# Patient Record
Sex: MALE | Race: WHITE | NOT HISPANIC OR LATINO | Employment: FULL TIME | URBAN - METROPOLITAN AREA
[De-identification: names, ages, dates, MRNs, and addresses within clinical notes are randomized per-mention and may not be internally consistent; named-entity substitution may affect disease eponyms.]

---

## 2022-01-06 ENCOUNTER — OFFICE VISIT (OUTPATIENT)
Dept: OBGYN CLINIC | Facility: CLINIC | Age: 62
End: 2022-01-06
Payer: COMMERCIAL

## 2022-01-06 VITALS
SYSTOLIC BLOOD PRESSURE: 153 MMHG | HEART RATE: 73 BPM | BODY MASS INDEX: 28.5 KG/M2 | HEIGHT: 67 IN | WEIGHT: 181.6 LBS | DIASTOLIC BLOOD PRESSURE: 80 MMHG

## 2022-01-06 DIAGNOSIS — M51.36 DDD (DEGENERATIVE DISC DISEASE), LUMBAR: ICD-10-CM

## 2022-01-06 DIAGNOSIS — M54.50 RIGHT LOW BACK PAIN, UNSPECIFIED CHRONICITY, UNSPECIFIED WHETHER SCIATICA PRESENT: ICD-10-CM

## 2022-01-06 DIAGNOSIS — M25.551 PAIN IN RIGHT HIP: Primary | ICD-10-CM

## 2022-01-06 DIAGNOSIS — M54.16 RADICULOPATHY, LUMBAR REGION: ICD-10-CM

## 2022-01-06 PROCEDURE — 99204 OFFICE O/P NEW MOD 45 MIN: CPT | Performed by: ORTHOPAEDIC SURGERY

## 2022-01-06 RX ORDER — EZETIMIBE 10 MG/1
TABLET ORAL
COMMUNITY
Start: 2007-11-16 | End: 2022-02-18

## 2022-01-06 RX ORDER — DICLOFENAC SODIUM 75 MG/1
75 TABLET, DELAYED RELEASE ORAL 2 TIMES DAILY
Qty: 60 TABLET | Refills: 1 | Status: SHIPPED | OUTPATIENT
Start: 2022-01-06

## 2022-01-06 RX ORDER — OMEGA-3-ACID ETHYL ESTERS 1 G/1
CAPSULE, LIQUID FILLED ORAL
COMMUNITY
Start: 2007-11-16 | End: 2022-02-18

## 2022-01-06 NOTE — PROGRESS NOTES
Assessment/Plan     1  Pain in right hip    2  Right low back pain, unspecified chronicity, unspecified whether sciatica present    3  DDD (degenerative disc disease), lumbar    4  Radiculopathy, lumbar region      Orders Placed This Encounter   Procedures    XR hip/pelv 2-3 vws right if performed    XR spine lumbar minimum 4 views non injury    MRI lumbar spine wo contrast    Ambulatory referral to Pain Management       · Patient has mild right hip osteoarthritis, L5-S1 DDD and lumbar radiculopathy   · Will be ordering MRI lumbar spine to r/o radiculopathy   · Will be referring patient to PM Dr Jennifer Chirinos to discuss MRI results  · PT order was given out today for low back   Prescribed patient Diclofenac prn pain, medications warnings were reviewed with patient  · He will call for  Medrol Satya to be sent to the pharmacy if he has no relief from the Diclofenac  He is aware the steroids may dampen his immune system and to take extra precautions  · May use heat or ice to his low back for pain relief    Return for Follow up with Dr Jennifer Chirinos to discuss MRI lumbar spine   I answered all of the patient's questions during the visit and provided education of the patient's condition during the visit  The patient verbalized understanding of the information given and agrees with the plan  This note was dictated using [employer] software  It may contain errors including improperly dictated words  Please contact physician directly for any questions  History of Present Illness   Chief complaint:   Chief Complaint   Patient presents with    Right Hip - Pain       HPI: Danny Richmond is a 57 y o  male that c/o right hip pain  He states he has been having right sided low back, buttock and numbness radiating down to the posterolateral knee for years off and on is progressively getting worse the last year  He was treating with a chiropractor  Patient is worse with hyperextension of his low back and twisting   He notes occasional right leg instability  He is currently not taking any pain medications but was taking IBU with relief  He notes occasional radiating pain down the calf  He has no history of having any injections , physical therapy or surgeries on the right hip  Denies any bladder or bowel changes  ROS:    See HPI for musculoskeletal review  All other systems reviewed are negative     Historical Information   History reviewed  No pertinent past medical history  Past Surgical History:   Procedure Laterality Date    HEMORROIDECTOMY       Social History   Social History     Substance and Sexual Activity   Alcohol Use None     Social History     Substance and Sexual Activity   Drug Use Not on file     Social History     Tobacco Use   Smoking Status Never Smoker   Smokeless Tobacco Never Used     Family History: No family history on file  Current Outpatient Medications on File Prior to Visit   Medication Sig Dispense Refill    ezetimibe (Zetia) 10 mg tablet Take by mouth      omega-3-acid ethyl esters (Lovaza) 1 g capsule Take by mouth       No current facility-administered medications on file prior to visit  Allergies   Allergen Reactions    Other Sneezing       Objective   Vitals: Blood pressure 153/80, pulse 73, height 5' 7" (1 702 m), weight 82 4 kg (181 lb 9 6 oz)  ,Body mass index is 28 44 kg/m²  PE:  AAOx 3  WDWN  Hearing intact, no drainage from eyes  Regular rate  no audible wheezing  no abdominal distension  LE compartments soft, skin intact    right hip:   No dislocation/deformity  Neg  Stinchfield  ROM: FF 0-130 with mild pain   ER 0-60  IR 0-20 with mild pain   +  Becky Test  +  Impingement test  No TTP over greater trochanter  Abduction: 5/5/ no pain with resistant abduction   Neg   Bimal's test  No TTP over SIJ    bilateralLE:    LLE:  EHL/AT/GS/quads/hamstrings/iliopsoas 5/5, sensation grossly intact L4, L5, S1, palpable pedal pulse    RLE:  EHL/AT/GS/quads/hamstrings/iliopsoas 5/5, sensation grossly intact , L5, S1,  Decrease sensation L4, palpable pedal pulse    Back:    No TTP over lumbar spinous processes, paraspinal musculature  SLR: Neg       Imaging Studies: I have personally reviewed pertinent films in PACS   XR righthip:  Mild DJD   Xr lumbar spine: L5-S1 DDD       Scribe Attestation    I,:   Maura Joiner am acting as a scribe while in the presence of the attending physician :       I,:  Zachary Nick DO personally performed the services described in this documentation    as scribed in my presence :

## 2022-01-28 ENCOUNTER — TELEPHONE (OUTPATIENT)
Dept: OBGYN CLINIC | Facility: HOSPITAL | Age: 62
End: 2022-01-28

## 2022-01-28 NOTE — TELEPHONE ENCOUNTER
Patient sees Dr Mace    Patient is calling regarding a pre-auth for a MRI of the Lumbar Spine  Patient states that Pernell Arteaga just called him to cancel his appt for 01/30/22 due to no pre-auth approval   It was just submitted yesterday 01/27/22    Patient is very upset and would like to know what happened      Please Advise  cb - [phone]

## 2022-02-14 ENCOUNTER — HOSPITAL ENCOUNTER (OUTPATIENT)
Dept: MRI IMAGING | Facility: HOSPITAL | Age: 62
Discharge: HOME | End: 2022-02-14
Attending: ORTHOPAEDIC SURGERY
Payer: COMMERCIAL

## 2022-02-14 DIAGNOSIS — M54.50 RIGHT LOW BACK PAIN, UNSPECIFIED CHRONICITY, UNSPECIFIED WHETHER SCIATICA PRESENT: ICD-10-CM

## 2022-02-14 DIAGNOSIS — M54.16 RADICULOPATHY, LUMBAR REGION: ICD-10-CM

## 2022-02-14 DIAGNOSIS — M51.36 DDD (DEGENERATIVE DISC DISEASE), LUMBAR: ICD-10-CM

## 2022-02-14 PROCEDURE — G1004 CDSM NDSC: HCPCS

## 2022-02-14 PROCEDURE — 72148 MRI LUMBAR SPINE W/O DYE: CPT

## 2022-02-18 ENCOUNTER — CONSULT (OUTPATIENT)
Dept: PAIN MEDICINE | Facility: CLINIC | Age: 62
End: 2022-02-18
Payer: COMMERCIAL

## 2022-02-18 VITALS
BODY MASS INDEX: 28.41 KG/M2 | HEIGHT: 67 IN | OXYGEN SATURATION: 97 % | DIASTOLIC BLOOD PRESSURE: 80 MMHG | WEIGHT: 181 LBS | SYSTOLIC BLOOD PRESSURE: 122 MMHG | TEMPERATURE: 98 F | HEART RATE: 73 BPM

## 2022-02-18 DIAGNOSIS — M54.50 RIGHT LOW BACK PAIN, UNSPECIFIED CHRONICITY, UNSPECIFIED WHETHER SCIATICA PRESENT: ICD-10-CM

## 2022-02-18 DIAGNOSIS — M51.36 DDD (DEGENERATIVE DISC DISEASE), LUMBAR: Primary | ICD-10-CM

## 2022-02-18 DIAGNOSIS — M54.16 RADICULOPATHY, LUMBAR REGION: ICD-10-CM

## 2022-02-18 DIAGNOSIS — M51.26 LUMBAR DISC HERNIATION: ICD-10-CM

## 2022-02-18 PROCEDURE — 99244 OFF/OP CNSLTJ NEW/EST MOD 40: CPT | Performed by: PHYSICAL MEDICINE & REHABILITATION

## 2022-02-18 NOTE — PATIENT INSTRUCTIONS
Lumbar Radiculopathy   WHAT YOU NEED TO KNOW:   Lumbar radiculopathy is a painful condition that happens when a nerve in your lumbar spine (lower back) is pinched or irritated  Nerves control feeling and movement in your body  You may have numbness or pain that shoots down from your lower back towards your foot  DISCHARGE INSTRUCTIONS:   Medicines:   · Medicines:     ? NSAIDs , such as ibuprofen, help decrease swelling, pain, and fever  This medicine is available with or without a doctor's order  NSAIDs can cause stomach bleeding or kidney problems in certain people  If you take blood thinner medicine, always ask your healthcare provider if NSAIDs are safe for you  Always read the medicine label and follow directions  ? Muscle relaxers  help decrease pain and muscle spasms  ? Opioids: This is a strong medicine given to reduce severe pain  It is also called narcotic pain medicine  Take this medicine exactly as directed by your healthcare provider  ? Oral steroids: Steroids may also be given to reduce pain and swelling  ? Take your medicine as directed  Contact your healthcare provider if you think your medicine is not helping or if you have side effects  Tell him of her if you are allergic to any medicine  Keep a list of the medicines, vitamins, and herbs you take  Include the amounts, and when and why you take them  Bring the list or the pill bottles to follow-up visits  Carry your medicine list with you in case of an emergency  Follow up with your healthcare provider or spine specialist within 1 to 3 weeks:  After your first follow-up appointment, return to your healthcare provider or spine specialist every 2 weeks until you have healed  Ask for information about physical therapy for your condition  Write down your questions so you remember to ask them during your visits  Physical therapy:  You may need physical therapy to improve your condition   Your physical therapist may teach you certain exercises to improve posture (the way you stand and sit), flexibility, and strength in your lower back  Self care:   · Stay active: It is best to be active when you have lumbar radiculopathy  Your physical therapist or healthcare provider may tell you to take walks to ease yourself back into your daily routine  Avoid long periods of bed rest  Bed rest could worsen your symptoms  Do not move in ways that increase your pain  Ask for more information about the best ways to stay active  · Use ice or heat packs:  Use ice or heat packs as directed on the sore area of your body to decrease the pain and swelling  Put ice in a plastic bag covered with a towel on your low back  Cover heated items with a towel to avoid burns  Use ice and heat as directed  · Avoid heavy lifting: Your condition may worsen if you lift heavy things  Avoid lifting if possible  · Maintain a healthy weight:  Excess body weight may strain your back  Talk with your healthcare provider about ways to lose excess weight if you are overweight  Contact your healthcare provider or spine specialist if:   · Your pain does not improve within 1 to 3 weeks after treatment  · Your pain and weakness keep you from your normal activities at work, home, or school  · You lose more than 10 pounds in 6 months without trying  · You become depressed or sad because of the pain  · You have questions or concerns about your condition or care  Return to the emergency department if:   · You have a fever greater than 100 4°F for longer than 2 days  · You have new, severe back or leg pain, or your pain spreads to both legs  · You have any new signs of numbness or weakness, especially in your lower back, legs, arms, or genital area  · You have new trouble controlling your urine and bowel movements  · You do not feel like your bladder empties when you urinate      © Copyright [employer] 2021 Information is for End User's use only and may not be sold, redistributed or otherwise used for commercial purposes  All illustrations and images included in CareNotes® are the copyrighted property of A D A M , Inc  or Deepak Herndon  The above information is an  only  It is not intended as medical advice for individual conditions or treatments  Talk to your doctor, nurse or pharmacist before following any medical regimen to see if it is safe and effective for you

## 2022-02-18 NOTE — PROGRESS NOTES
Assessment  1  DDD (degenerative disc disease), lumbar    2  Right low back pain, unspecified chronicity, unspecified whether sciatica present    3  Radiculopathy, lumbar region    4  Lumbar disc herniation        Plan  Mr Narayan is a pleasant 19-year-old male who presents for initial evaluation regarding low back pain with radiating symptoms into the right lower extremity of several months duration  During today's evaluation he is demonstrating clinical and diagnostic evidence of lumbar radiculopathy likely in relation to the mild central stenosis at L3-L4 and broad-based disc herniation at L4-L5 as identified on the lumbar MRI without contrast   At this time interventional approaches would be beneficial and warranted  As such we will   1  We will plan for lumbar epidural steroid injection L4-L5 right paramedian approach under fluoro guidance   2  Offered membrane stabilizing agent medications including gabapentin but patient does not wish to trial oral medications at this time   3  Complete risks and benefits including bleeding, infection, tissue reaction, nerve injury and allergic reaction were discussed  The approach was demonstrated using models and literature was provided  Verbal and written consent was obtained  My impressions and treatment recommendations were discussed in detail with the patient who verbalized understanding and had no further questions  Discharge instructions were provided  I personally saw and examined the patient and I agree with the above discussed plan of care  Orders Placed This Encounter   Procedures    [place] spine and pain procedure     Standing Status:   Future     Standing Expiration Date:   2/18/2026     Order Specific Question:   Reason for Exam:     Answer:   LESI (L4-L5)     Order Specific Question:   Anticoagulant hold needed? Answer:   No     No orders of the defined types were placed in this encounter  History of Present Illness    Eulogio Hernandez is a 57 y o  male presents to [facility] for initial evaluation regarding 4 years duration of low back pain with radiating symptoms into the right lower extremity  Patient denies any significant inciting event or recent trauma  Today reports moderate to severe pain rated 3 to 7/10 and interfering with daily activities  Pain is nearly constant 60-95% of the time that is present throughout the day and night  Describes symptoms as shooting, numbness, throbbing pain  Also reports lower extremity weakness but denies falls  Does not use any durable medical equipment for ambulation  Symptoms are worse with lying down, bending, sitting, walking, exercise  Has had no significant relief with chiropractic manipulation and reports moderate relief with heat  Currently taking over-the-counter Tylenol and Motrin with minimal relief  Presents today for initial evaluation  I have personally reviewed and/or updated the patient's past medical history, past surgical history, family history, social history, current medications, allergies, and vital signs today  Review of Systems   Constitutional: Negative for fever and unexpected weight change  HENT: Negative for trouble swallowing  Eyes: Negative for visual disturbance  Respiratory: Negative for shortness of breath and wheezing  Cardiovascular: Negative for chest pain and palpitations  Gastrointestinal: Negative for constipation, diarrhea, nausea and vomiting  Endocrine: Positive for polyuria  Negative for cold intolerance, heat intolerance and polydipsia  Genitourinary: Negative for difficulty urinating and frequency  Musculoskeletal: Positive for back pain, joint swelling and myalgias  Negative for arthralgias and gait problem  Skin: Positive for wound  Negative for rash  Neurological: Negative for dizziness, seizures, syncope, weakness and headaches  Hematological: Does not bruise/bleed easily     Psychiatric/Behavioral: Negative for dysphoric mood  All other systems reviewed and are negative  There is no problem list on file for this patient  Past Medical History:   Diagnosis Date    Low back pain        Past Surgical History:   Procedure Laterality Date    HEMORROIDECTOMY      LAPAROSCOPIC INGUINAL HERNIA REPAIR         Family History   Problem Relation Age of Onset    No Known Problems Mother     No Known Problems Father        Social History     Occupational History    Not on file   Tobacco Use    Smoking status: Never Smoker    Smokeless tobacco: Never Used   Vaping Use    Vaping Use: Never used   Substance and Sexual Activity    Alcohol use: Yes     Alcohol/week: 2 0 standard drinks     Types: 2 Cans of beer per week     Comment: occasionally    Drug use: Never    Sexual activity: Yes     Partners: Female     Birth control/protection: Male Sterilization     Comment:        Current Outpatient Medications on File Prior to Visit   Medication Sig    diclofenac (VOLTAREN) 75 mg EC tablet Take 1 tablet (75 mg total) by mouth 2 (two) times a day PRN for pain (Patient not taking: Reported on 2/18/2022 )    [DISCONTINUED] ezetimibe (Zetia) 10 mg tablet Take by mouth    [DISCONTINUED] omega-3-acid ethyl esters (Lovaza) 1 g capsule Take by mouth     No current facility-administered medications on file prior to visit  Allergies   Allergen Reactions    Other Sneezing       Physical Exam    /80   Pulse 73   Temp 98 °F (36 7 °C)   Ht 5' 7" (1 702 m)   Wt 82 1 kg (181 lb)   SpO2 97%   BMI 28 35 kg/m²     Constitutional: normal, well developed, well nourished, alert, in no distress and non-toxic and no overt pain behavior    Eyes: anicteric  HEENT: grossly intact  Neck: supple, symmetric, trachea midline and no masses   Pulmonary:even and unlabored  Cardiovascular:No edema or pitting edema present  Skin:Normal without rashes or lesions and well hydrated  Psychiatric:Mood and affect appropriate  Neurologic:Cranial Nerves II-XII grossly intact  Musculoskeletal:antalgic, tenderness to palpation bilateral lumbar paraspinals, decreased active and passive range of motion with lumbar flexion and extension limited by pain, MMT 5/5 bilateral lower extremities, sensation decreased to light touch in patchy distribution pressure that her right leg, DTRs within normal limits, positive straight leg raise in the supine position with radicular pain into the posterolateral right leg    Imaging    Study Result    Narrative & Impression   MRI LUMBAR SPINE WITHOUT CONTRAST     INDICATION: M54 50: Low back pain, unspecified  M51 36: Other intervertebral disc degeneration, lumbar region  M54 16: Radiculopathy, lumbar region      COMPARISON:  None      TECHNIQUE:  Sagittal T1, sagittal T2, sagittal inversion recovery, axial T1 and axial T2, coronal T2     IMAGE QUALITY:  Diagnostic     FINDINGS:     VERTEBRAL BODIES:  There are 5 lumbar type vertebral bodies  Normal alignment of the lumbar spine  No spondylolysis or spondylolisthesis  No scoliosis  No compression fracture  Normal marrow signal is identified within the visualized bony   structures  No discrete marrow lesion      SACRUM:  Normal signal within the sacrum  No evidence of insufficiency or stress fracture      DISTAL CORD AND CONUS:  Normal size and signal within the distal cord and conus      PARASPINAL SOFT TISSUES:  Paraspinal soft tissues are unremarkable      LOWER THORACIC DISC SPACES:  Normal disc height and signal   No disc herniation, canal stenosis or foraminal narrowing      LUMBAR DISC SPACES:     L1-L2:  Normal      L2-L3:  Normal      L3-L4:  Mild facet hypertrophy  Minimal bulge  Minimal central stenosis without foraminal narrowing      L4-L5:  Broad based left foraminal/far lateral protrusion type disc herniation contacts the extraforaminal left L4 nerve root    Correlate for left L4 radiculopathy      L5-S1: Normal      IMPRESSION:     Left foraminal/far lateral protrusion at L4-5 contacts the extraforaminal left L4 nerve root    Correlate for left L4 radiculopathy         Workstation performed: [id number]

## 2022-03-10 ENCOUNTER — HOSPITAL ENCOUNTER (OUTPATIENT)
Dept: RADIOLOGY | Facility: MEDICAL CENTER | Age: 62
Discharge: HOME | End: 2022-03-10
Admitting: PHYSICAL MEDICINE & REHABILITATION
Payer: COMMERCIAL

## 2022-03-10 VITALS
SYSTOLIC BLOOD PRESSURE: 146 MMHG | TEMPERATURE: 97.9 F | RESPIRATION RATE: 20 BRPM | HEART RATE: 72 BPM | DIASTOLIC BLOOD PRESSURE: 87 MMHG | OXYGEN SATURATION: 97 %

## 2022-03-10 DIAGNOSIS — M54.16 RADICULOPATHY, LUMBAR REGION: ICD-10-CM

## 2022-03-10 DIAGNOSIS — M54.50 RIGHT LOW BACK PAIN, UNSPECIFIED CHRONICITY, UNSPECIFIED WHETHER SCIATICA PRESENT: ICD-10-CM

## 2022-03-10 DIAGNOSIS — M51.36 DDD (DEGENERATIVE DISC DISEASE), LUMBAR: ICD-10-CM

## 2022-03-10 DIAGNOSIS — M51.26 LUMBAR DISC HERNIATION: ICD-10-CM

## 2022-03-10 PROCEDURE — 62323 NJX INTERLAMINAR LMBR/SAC: CPT | Performed by: PHYSICAL MEDICINE & REHABILITATION

## 2022-03-10 RX ORDER — METHYLPREDNISOLONE ACETATE 80 MG/ML
80 INJECTION, SUSPENSION INTRA-ARTICULAR; INTRALESIONAL; INTRAMUSCULAR; PARENTERAL; SOFT TISSUE ONCE
Status: COMPLETED | OUTPATIENT
Start: 2022-03-10 | End: 2022-03-10

## 2022-03-10 RX ADMIN — IOHEXOL 1 ML: 300 INJECTION, SOLUTION INTRAVENOUS at 09:45

## 2022-03-10 RX ADMIN — METHYLPREDNISOLONE ACETATE 80 MG: 80 INJECTION, SUSPENSION INTRA-ARTICULAR; INTRALESIONAL; INTRAMUSCULAR; PARENTERAL; SOFT TISSUE at 09:46

## 2022-03-10 NOTE — DISCHARGE INSTRUCTIONS
Epidural Steroid Injection   WHAT YOU NEED TO KNOW:   An epidural steroid injection (SOM) is a procedure to inject steroid medicine into the epidural space  The epidural space is between your spinal cord and vertebrae  Steroids reduce inflammation and fluid buildup in your spine that may be causing pain  You may be given pain medicine along with the steroids  ACTIVITY  · Do not drive or operate machinery today  · No strenuous activity today - bending, lifting, etc   · You may resume normal activites starting tomorrow - start slowly and as tolerated  · You may shower today, but no tub baths or hot tubs  · You may have numbness for several hours from the local anesthetic  Please use caution and common sense, especially with weight-bearing activities  CARE OF THE INJECTION SITE  · If you have soreness or pain, apply ice to the area today (20 minutes on/20 minutes off)  · Starting tomorrow, you may use warm, moist heat or ice if needed  · You may have an increase or change in your discomfort for 36-48 hours after your treatment  · Apply ice and continue with any pain medication you have been prescribed  · [facility]ify [facility] if you have any of the following: redness, drainage, swelling, headache, stiff neck or fever above 100°F     SPECIAL INSTRUCTIONS  · Our office will contact you in approximately 7 days for a progress report  MEDICATIONS  · Continue to take all routine medications  · Our office may have instructed you to hold some medications  As no general anesthesia was used in today's procedure, you should not experience any side effects related to anesthesia  If you have a problem specifically related to your procedure, please call our office at [phone]  Problems not related to your procedure should be directed to your primary care physician

## 2022-03-10 NOTE — H&P
History of Present Illness: The patient is a 57 y o  male who presents with complaints of low back pain    There is no problem list on file for this patient  Past Medical History:   Diagnosis Date    Low back pain        Past Surgical History:   Procedure Laterality Date    HEMORROIDECTOMY      LAPAROSCOPIC INGUINAL HERNIA REPAIR           Current Outpatient Medications:     diclofenac (VOLTAREN) 75 mg EC tablet, Take 1 tablet (75 mg total) by mouth 2 (two) times a day PRN for pain (Patient not taking: Reported on 2/18/2022 ), Disp: 60 tablet, Rfl: 1    Current Facility-Administered Medications:     iohexol (OMNIPAQUE) 300 mg/mL injection 50 mL, 50 mL, Epidural, Once, Chana Eris, DO    methylPREDNISolone acetate (DEPO-MEDROL) injection 80 mg, 80 mg, Epidural, Once, Chana Eris, DO    Allergies   Allergen Reactions    Other Sneezing       Physical Exam:   Vitals:    03/10/22 0932   BP: 136/79   Pulse: 69   Resp: 18   Temp: 97 9 °F (36 6 °C)   SpO2: 96%     General: Awake, Alert, Oriented x 3, Mood and affect appropriate  Respiratory: Respirations even and unlabored  Cardiovascular: Peripheral pulses intact; no edema  Musculoskeletal Exam:  Tenderness to palpation bilateral lumbar paraspinals    ASA Score: 2    Patient/Chart Verification  Patient ID Verified: Verbal  ID Band Applied: No  Consents Confirmed: Procedural,To be obtained in the Pre-Procedure area  H&P( within 30 days) Verified: To be obtained in the Pre-Procedure area  Interval H&P(within 24 hr) Complete (required for Outpatients and Surgery Admit only): To be obtained in the Pre-Procedure area  Allergies Reviewed: Yes  Anticoag/NSAID held?: NA (Pt denies taking blood thinning medications)  Currently on antibiotics?: No    Assessment:   1  Right low back pain, unspecified chronicity, unspecified whether sciatica present    2  DDD (degenerative disc disease), lumbar    3  Radiculopathy, lumbar region    4   Lumbar disc herniation Plan: LESI (L4-L5)

## 2022-03-17 ENCOUNTER — TELEPHONE (OUTPATIENT)
Dept: PAIN MEDICINE | Facility: CLINIC | Age: 62
End: 2022-03-17

## 2022-03-17 NOTE — TELEPHONE ENCOUNTER
Pt reports 40-50% improvement post inj   Pain level 4/10  Pt aware I will call next week for an update

## 2022-05-10 ENCOUNTER — APPOINTMENT (OUTPATIENT)
Dept: RADIOLOGY | Facility: MEDICAL CENTER | Age: 62
End: 2022-05-10
Payer: COMMERCIAL

## 2022-05-10 DIAGNOSIS — M25.512 ACUTE PAIN OF LEFT SHOULDER: ICD-10-CM

## 2022-05-10 PROCEDURE — 73030 X-RAY EXAM OF SHOULDER: CPT

## 2022-05-18 DIAGNOSIS — M19.019 ARTHRITIS OF GLENOHUMERAL JOINT: Primary | ICD-10-CM

## 2022-05-25 ENCOUNTER — TELEPHONE (OUTPATIENT)
Dept: PAIN MEDICINE | Facility: CLINIC | Age: 62
End: 2022-05-25

## 2022-07-05 NOTE — TELEPHONE ENCOUNTER
Pt called in he was suppose to be scheduled in [facility] but the pt is scheduled in [email or web address]  Please be advised thank you    Pt can be reached @ [phone]

## 2022-07-05 NOTE — TELEPHONE ENCOUNTER
Pt is a Dr Afia Campa patient and was scheduled for [facility]  Patient called to tell them today at 11:00 and no one called him back  Pt is a little upset about it  Pt had to wait a couple of months and now he is in [facility]  patient stated he made the appointment with a [facility] person  Please call pt back his appointment is tomorrow at 3:15  Now pt has to cancel this appointment because of work  Please call pt and cancel appointment and reschedule with [place] with Dr Afia Campa as soon as possible      MRN # [id number]  Arie Garg  1960  [id number]

## 2022-07-06 NOTE — TELEPHONE ENCOUNTER
Cristian Turcios,    Are you able to help this patient get rescheduled for his procedure in the [facility] area?     Marianna Hayes

## 2022-07-06 NOTE — TELEPHONE ENCOUNTER
Pt called  in the phone  In error he was scheduled at the [facility] office it should be in [place]   Transferred the call  Please be advised thank you

## 2022-07-06 NOTE — TELEPHONE ENCOUNTER
Severiano Chirinos, this is the patient I left you a voicemail message about  He would really like a call today to see when he can be rescheduled to [place]      Thank you,  Osmani Luciano

## 2022-08-10 ENCOUNTER — PROCEDURE VISIT (OUTPATIENT)
Dept: PAIN MEDICINE | Facility: CLINIC | Age: 62
End: 2022-08-10
Payer: COMMERCIAL

## 2022-08-10 DIAGNOSIS — M19.019 ARTHRITIS OF GLENOHUMERAL JOINT: Primary | ICD-10-CM

## 2022-08-10 PROCEDURE — 20611 DRAIN/INJ JOINT/BURSA W/US: CPT | Performed by: PHYSICAL MEDICINE & REHABILITATION

## 2022-08-10 RX ORDER — LIDOCAINE HYDROCHLORIDE 10 MG/ML
5 INJECTION, SOLUTION INFILTRATION; PERINEURAL ONCE
Status: COMPLETED | OUTPATIENT
Start: 2022-08-10 | End: 2022-08-10

## 2022-08-10 RX ORDER — METHYLPREDNISOLONE ACETATE 40 MG/ML
40 INJECTION, SUSPENSION INTRA-ARTICULAR; INTRALESIONAL; INTRAMUSCULAR; SOFT TISSUE ONCE
Status: COMPLETED | OUTPATIENT
Start: 2022-08-10 | End: 2022-08-10

## 2022-08-10 RX ORDER — BUPIVACAINE HYDROCHLORIDE 2.5 MG/ML
INJECTION, SOLUTION EPIDURAL; INFILTRATION; INTRACAUDAL ONCE
Status: CANCELLED | OUTPATIENT
Start: 2022-08-10

## 2022-08-10 RX ADMIN — METHYLPREDNISOLONE ACETATE 40 MG: 40 INJECTION, SUSPENSION INTRA-ARTICULAR; INTRALESIONAL; INTRAMUSCULAR; SOFT TISSUE at 15:49

## 2022-08-10 RX ADMIN — LIDOCAINE HYDROCHLORIDE 5 ML: 10 INJECTION, SOLUTION INFILTRATION; PERINEURAL at 15:49

## 2022-08-10 NOTE — PROGRESS NOTES
Indication:  Shoulder pain  Preprocedure diagnosis:  1  Osteoarthritis of the shoulder                                                   2  Shoulder pain  Postprocedure diagnosis:  1  Osteoarthritis of the shoulder                                                     2  Shoulder pain    Procedure: Ultrasound-guided left glenohumeral injection    After discussing the risks, benefits, and alternatives to the procedure, the patient expressed understanding and wished to proceed  The patient was brought to the procedure suite and placed in the sidelying position  A procedural pause was conducted to verify:  correct patient identity, procedure to be performed and as applicable, correct side and site, correct patient position, and availability of implants, special equipment or special requirements  A simple surgical tray was used  Prior to the procedure, the shoulder was examined with a 12 MHz linear transducer to visualize the glenohumeral joint and determine the optimal needle path  Following this, the shoulder was prepared with a ChloraPrep scrub, then re-examined using the same transducer, a sterile ultrasound transducer cover, and sterile ultrasound transducer gel  Thereafter, using continuous ultrasound guidance, a 2 5 in 25 gauge needle was advanced into the glenohumeral joint  After visualization of the tip in the target area and negative aspiration for blood, a mixture 40 mg Depo-Medrol in 3 mL of 0 25% bupivacaine was injected into the joint  Following the injection the needle was withdrawn  The patient tolerated the procedure well and there were no apparent complications  After an appropriate amount of observation, the patient was dismissed from the clinic in good condition under their own power

## 2022-08-17 ENCOUNTER — TELEPHONE (OUTPATIENT)
Dept: PAIN MEDICINE | Facility: CLINIC | Age: 62
End: 2022-08-17

## 2022-08-17 NOTE — TELEPHONE ENCOUNTER
Pt reports 10% improvement post inj  Pain level 10/10    Patient is aware I will call back next week to get an update